# Patient Record
Sex: FEMALE | URBAN - METROPOLITAN AREA
[De-identification: names, ages, dates, MRNs, and addresses within clinical notes are randomized per-mention and may not be internally consistent; named-entity substitution may affect disease eponyms.]

---

## 2021-09-03 ENCOUNTER — APPOINTMENT (RX ONLY)
Dept: URBAN - METROPOLITAN AREA CLINIC 27 | Facility: CLINIC | Age: 46
Setting detail: DERMATOLOGY
End: 2021-09-03

## 2021-09-03 DIAGNOSIS — L30.4 ERYTHEMA INTERTRIGO: ICD-10-CM

## 2021-09-03 DIAGNOSIS — B36.0 PITYRIASIS VERSICOLOR: ICD-10-CM

## 2021-09-03 PROCEDURE — 99203 OFFICE O/P NEW LOW 30 MIN: CPT

## 2021-09-03 PROCEDURE — ? TREATMENT REGIMEN

## 2021-09-03 PROCEDURE — ? COUNSELING

## 2021-09-03 ASSESSMENT — LOCATION DETAILED DESCRIPTION DERM
LOCATION DETAILED: RIGHT INFRAMAMMARY CREASE (INNER QUADRANT)
LOCATION DETAILED: LOWER STERNUM
LOCATION DETAILED: LEFT INFRAMAMMARY CREASE (INNER QUADRANT)
LOCATION DETAILED: MIDDLE STERNUM
LOCATION DETAILED: EPIGASTRIC SKIN

## 2021-09-03 ASSESSMENT — LOCATION SIMPLE DESCRIPTION DERM
LOCATION SIMPLE: LEFT BREAST
LOCATION SIMPLE: CHEST
LOCATION SIMPLE: ABDOMEN
LOCATION SIMPLE: RIGHT BREAST

## 2021-09-03 ASSESSMENT — LOCATION ZONE DERM: LOCATION ZONE: TRUNK

## 2021-09-03 NOTE — PROCEDURE: TREATMENT REGIMEN
Initiate Treatment: ketoconazole 2 % shampoo : Cleanse affected areas (chest, abdomen) daily x 4 weeks ( apply to dry skin, let sit for 5-10 minutes then rinse)\\nketoconazole 2 % topical cream: Apply to affected areas (chest, abdomen) BID x 4 weeks. Rx given with other dx\\n
Plan: Pt indicated that she doesnt want to use any topical steroids at all.  RE-assured pt that creams are anti-fungal and not steroidal
Detail Level: Zone
Initiate Treatment: ketoconazole 2 % shampoo TP Frequency: BIW Sig: Cleanse affected areas (chest, abdomen) daily x 4 weeks ( apply to dry skin, let sit for 5-10 minutes then rinse)\\nQuantity: 120 mL Days Supply: 30\\nketoconazole 2 % topical cream TP Frequency: BID Sig: Apply to affected areas (chest, abdomen) BID x 4 weeks\\nQuantity: 60 g Days Supply: 30\\n\\nPaper scripts provided per pt request

## 2021-09-03 NOTE — HPI: RASH
What Type Of Note Output Would You Prefer (Optional)?: Standard Output
Is The Patient Presenting As Previously Scheduled?: Yes
How Severe Is Your Rash?: mild
Is This A New Presentation, Or A Follow-Up?: Rash
Additional History: Pt stated that rash under breast and on chest has been present for months, but in the last 2 weeks, pink patches on the abdomen just appeared....pt stated that areas are not itchy unless she touches them